# Patient Record
Sex: FEMALE | Race: WHITE | NOT HISPANIC OR LATINO | URBAN - METROPOLITAN AREA
[De-identification: names, ages, dates, MRNs, and addresses within clinical notes are randomized per-mention and may not be internally consistent; named-entity substitution may affect disease eponyms.]

---

## 2021-11-17 ENCOUNTER — OFFICE VISIT (OUTPATIENT)
Dept: URBAN - METROPOLITAN AREA CLINIC 94 | Facility: CLINIC | Age: 61
End: 2021-11-17
Payer: COMMERCIAL

## 2021-11-17 ENCOUNTER — WEB ENCOUNTER (OUTPATIENT)
Dept: URBAN - METROPOLITAN AREA CLINIC 94 | Facility: CLINIC | Age: 61
End: 2021-11-17

## 2021-11-17 VITALS
WEIGHT: 179 LBS | HEIGHT: 65 IN | BODY MASS INDEX: 29.82 KG/M2 | DIASTOLIC BLOOD PRESSURE: 80 MMHG | HEART RATE: 90 BPM | TEMPERATURE: 97.5 F | SYSTOLIC BLOOD PRESSURE: 153 MMHG

## 2021-11-17 DIAGNOSIS — R74.8 ELEVATED LIVER ENZYMES: ICD-10-CM

## 2021-11-17 DIAGNOSIS — K76.0 HEPATIC STEATOSIS: ICD-10-CM

## 2021-11-17 PROCEDURE — 99213 OFFICE O/P EST LOW 20 MIN: CPT | Performed by: PHYSICIAN ASSISTANT

## 2021-11-17 PROCEDURE — 99243 OFF/OP CNSLTJ NEW/EST LOW 30: CPT | Performed by: PHYSICIAN ASSISTANT

## 2021-11-17 RX ORDER — LEVOCETIRIZINE DIHYDROCHLORIDE 5 MG/1
TAKE 2 TABLETS BY ORAL ROUTE TABLET ORAL
Qty: 0 | Refills: 0 | Status: ACTIVE | COMMUNITY
Start: 1900-01-01

## 2021-11-17 RX ORDER — GABAPENTIN 800 MG/1
TAKE 1 TABLET (800 MG) BY ORAL ROUTE 3 TIMES PER DAY TABLET, FILM COATED ORAL
Qty: 0 | Refills: 0 | Status: ACTIVE | COMMUNITY
Start: 1900-01-01

## 2021-11-17 RX ORDER — DULOXETINE HYDROCHLORIDE 60 MG/1
1 CAPSULE CAPSULE, DELAYED RELEASE ORAL ONCE A DAY
Status: ACTIVE | COMMUNITY

## 2021-11-17 RX ORDER — ZOLPIDEM TARTRATE 10 MG/1
TAKE 1 TABLET (10 MG) BY ORAL ROUTE ONCE DAILY AT BEDTIME TABLET, FILM COATED ORAL 1
Qty: 0 | Refills: 0 | Status: ACTIVE | COMMUNITY
Start: 1900-01-01

## 2021-11-17 RX ORDER — ROPINIROLE 3 MG/1
TABLET, FILM COATED ORAL
Qty: 0 | Refills: 0 | Status: ACTIVE | COMMUNITY
Start: 1900-01-01

## 2021-11-17 RX ORDER — LISINOPRIL 10 MG/1
TAKE 1 TABLET (10 MG) BY ORAL ROUTE ONCE DAILY TABLET ORAL 1
Qty: 0 | Refills: 0 | Status: ON HOLD | COMMUNITY
Start: 1900-01-01

## 2021-11-17 RX ORDER — CLONAZEPAM 1 MG/1
1 TABLET TABLET ORAL ONCE A DAY
Status: ACTIVE | COMMUNITY

## 2021-11-17 RX ORDER — AMLODIPINE BESYLATE 10 MG/1
TAKE 1 TABLET (10 MG) BY ORAL ROUTE ONCE DAILY TABLET ORAL 1
Qty: 0 | Refills: 0 | Status: ACTIVE | COMMUNITY
Start: 1900-01-01

## 2021-11-17 NOTE — HPI-TODAY'S VISIT:
62 yo F evaluated today for hx of elevated liver enzymes. Pt is being referred to clinic by her PCP, DR Singleton. A copy of patient records will be faxed to DR Singleton for review.   Pt reports that she recently started seeing DR Singleton. Patient reports a hx of elevated liver enzymes for yrs and was dx with having fatty liver yrs ago. She denies a hx of ETOH use. She denies an autoimmune or genetic hx in family.  Patient does report taking opioids for chronic back pain and will be having surgery soon with DR Wong.   Pt denies abdominal swelling or chronic itching. She denies abdominal pain, changes in bowel habits, or melena/hematochezia.  Colonoscopy 1/2020 Dr Adame poor prep, Tubular adenoma ascending colon, 7 mm Transverse polyp ( no pathology  reported), otherwise normal colon Recommend surveillance colonoscopy 2 yrs  Colonoscopy 2018 DR Adame diverticulosis, 6 mm TV colon polyp and 24 mm descending colon polyp yuid-pedunculated. 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    EGD 2020 DR Adame - normal esophagus, gastric erythema, normal duodenum Bx reactive gastropathy, Negative H. Pylori.

## 2021-11-18 PROBLEM — 197321007: Status: ACTIVE | Noted: 2021-11-18

## 2021-11-18 PROBLEM — 428283002: Status: ACTIVE | Noted: 2021-11-18

## 2021-11-23 ENCOUNTER — TELEPHONE ENCOUNTER (OUTPATIENT)
Dept: URBAN - METROPOLITAN AREA CLINIC 94 | Facility: CLINIC | Age: 61
End: 2021-11-23

## 2021-11-24 LAB
A/G RATIO: 1.1
AAT, DNA ANALYSIS: (no result)
ACTIN (SMOOTH MUSCLE) ANTIBODY: 20
ADDITIONAL INFORMATION:: (no result)
ALBUMIN: 4.3
ALKALINE PHOSPHATASE: 197
ALPHA 2-MACROGLOBULINS, QN: 171
ALT (SGPT) P5P: 43
ALT (SGPT): 40
ANA DIRECT: POSITIVE
APOLIPOPROTEIN A-1: 137
AST (SGOT) P5P: 99
AST (SGOT): 89
BASO (ABSOLUTE): 0
BASOS: 1
BILIRUBIN, TOTAL: 0.3
BILIRUBIN, TOTAL: 0.3
BUN/CREATININE RATIO: 11
BUN: 7
CALCIUM: 9.6
CARBON DIOXIDE, TOTAL: 25
CERULOPLASMIN: 25
CHLORIDE: 99
CHOLESTEROL, TOTAL: 226
COMMENT:: (no result)
CREATININE: 0.61
EGFR IF AFRICN AM: 113
EGFR IF NONAFRICN AM: 98
EOS (ABSOLUTE): 0.2
EOS: 3
FERRITIN, SERUM: 95
FIBROSIS SCORE: 0.24
FIBROSIS SCORING:: (no result)
FIBROSIS STAGE: (no result)
GGT: 217
GLOBULIN, TOTAL: 3.9
GLUCOSE, SERUM: 132
GLUCOSE: 136
HAPTOGLOBIN: 189
HBSAG SCREEN: NEGATIVE
HEIGHT:: 65
HEMATOCRIT: 41.4
HEMATOLOGY COMMENTS:: (no result)
HEMOGLOBIN A1C: 7.7
HEMOGLOBIN: 13.4
HEP A AB, IGM: NEGATIVE
HEP B CORE AB, IGM: NEGATIVE
HEP C VIRUS AB: 0.1
IMMATURE CELLS: (no result)
IMMATURE GRANS (ABS): 0
IMMATURE GRANULOCYTES: 0
INTERPRETATIONS:: (no result)
IRON BIND.CAP.(TIBC): 323
IRON SATURATION: 14
IRON: 44
LIMITATIONS:: (no result)
LYMPHS (ABSOLUTE): 2.1
LYMPHS: 29
Lab: (no result)
MCH: 28.3
MCHC: 32.4
MCV: 87
MITOCHONDRIAL (M2) ANTIBODY: <20
MONOCYTES(ABSOLUTE): 0.5
MONOCYTES: 7
NASH GRADE: (no result)
NASH SCORE: 0.5
NASH SCORING: (no result)
NEUTROPHILS (ABSOLUTE): 4.4
NEUTROPHILS: 60
NRBC: (no result)
PLATELETS: 248
POTASSIUM: 3.8
PROTEIN, TOTAL: 8.2
RBC: 4.74
RDW: 15
SODIUM: 140
STEATOSIS GRADE: (no result)
STEATOSIS GRADING: (no result)
STEATOSIS SCORE: 0.93
TRIGLYCERIDES: 300
UIBC: 279
WBC: 7.2
WEIGHT:: 179

## 2021-12-03 ENCOUNTER — TELEPHONE ENCOUNTER (OUTPATIENT)
Dept: URBAN - METROPOLITAN AREA CLINIC 92 | Facility: CLINIC | Age: 61
End: 2021-12-03

## 2021-12-03 PROBLEM — 442685003: Status: ACTIVE | Noted: 2021-12-03

## 2021-12-14 ENCOUNTER — OFFICE VISIT (OUTPATIENT)
Dept: URBAN - METROPOLITAN AREA CLINIC 94 | Facility: CLINIC | Age: 61
End: 2021-12-14

## 2021-12-14 RX ORDER — LISINOPRIL 10 MG/1
TAKE 1 TABLET (10 MG) BY ORAL ROUTE ONCE DAILY TABLET ORAL 1
Qty: 0 | Refills: 0 | Status: ON HOLD | COMMUNITY
Start: 1900-01-01

## 2021-12-14 RX ORDER — LEVOCETIRIZINE DIHYDROCHLORIDE 5 MG/1
TAKE 2 TABLETS BY ORAL ROUTE TABLET ORAL
Qty: 0 | Refills: 0 | Status: ACTIVE | COMMUNITY
Start: 1900-01-01

## 2021-12-14 RX ORDER — ROPINIROLE 3 MG/1
TABLET, FILM COATED ORAL
Qty: 0 | Refills: 0 | Status: ACTIVE | COMMUNITY
Start: 1900-01-01

## 2021-12-14 RX ORDER — AMLODIPINE BESYLATE 10 MG/1
TAKE 1 TABLET (10 MG) BY ORAL ROUTE ONCE DAILY TABLET ORAL 1
Qty: 0 | Refills: 0 | Status: ACTIVE | COMMUNITY
Start: 1900-01-01

## 2021-12-14 RX ORDER — GABAPENTIN 800 MG/1
TAKE 1 TABLET (800 MG) BY ORAL ROUTE 3 TIMES PER DAY TABLET, FILM COATED ORAL
Qty: 0 | Refills: 0 | Status: ACTIVE | COMMUNITY
Start: 1900-01-01

## 2021-12-14 RX ORDER — CLONAZEPAM 1 MG/1
1 TABLET TABLET ORAL ONCE A DAY
Status: ACTIVE | COMMUNITY

## 2021-12-14 RX ORDER — DULOXETINE HYDROCHLORIDE 60 MG/1
1 CAPSULE CAPSULE, DELAYED RELEASE ORAL ONCE A DAY
Status: ACTIVE | COMMUNITY

## 2021-12-14 RX ORDER — ZOLPIDEM TARTRATE 10 MG/1
TAKE 1 TABLET (10 MG) BY ORAL ROUTE ONCE DAILY AT BEDTIME TABLET, FILM COATED ORAL 1
Qty: 0 | Refills: 0 | Status: ACTIVE | COMMUNITY
Start: 1900-01-01

## 2023-09-27 ENCOUNTER — DASHBOARD ENCOUNTERS (OUTPATIENT)
Age: 63
End: 2023-09-27

## 2023-10-03 ENCOUNTER — OFFICE VISIT (OUTPATIENT)
Dept: URBAN - METROPOLITAN AREA CLINIC 52 | Facility: CLINIC | Age: 63
End: 2023-10-03

## 2023-10-03 RX ORDER — LEVOCETIRIZINE DIHYDROCHLORIDE 5 MG/1
TAKE 2 TABLETS BY ORAL ROUTE TABLET ORAL
Qty: 0 | Refills: 0 | Status: ON HOLD | COMMUNITY
Start: 1900-01-01

## 2023-10-03 RX ORDER — ROPINIROLE 3 MG/1
TABLET, FILM COATED ORAL
Qty: 0 | Refills: 0 | Status: ON HOLD | COMMUNITY
Start: 1900-01-01

## 2023-10-03 RX ORDER — DULOXETINE HYDROCHLORIDE 60 MG/1
1 CAPSULE CAPSULE, DELAYED RELEASE ORAL ONCE A DAY
Status: ON HOLD | COMMUNITY

## 2023-10-03 RX ORDER — LISINOPRIL 10 MG/1
TAKE 1 TABLET (10 MG) BY ORAL ROUTE ONCE DAILY TABLET ORAL 1
Qty: 0 | Refills: 0 | COMMUNITY
Start: 1900-01-01

## 2023-10-03 RX ORDER — CLONAZEPAM 1 MG/1
1 TABLET TABLET ORAL ONCE A DAY
Status: ON HOLD | COMMUNITY

## 2023-10-03 RX ORDER — AMLODIPINE BESYLATE 10 MG/1
TAKE 1 TABLET (10 MG) BY ORAL ROUTE ONCE DAILY TABLET ORAL 1
Qty: 0 | Refills: 0 | Status: ON HOLD | COMMUNITY
Start: 1900-01-01

## 2023-10-03 RX ORDER — ZOLPIDEM TARTRATE 10 MG/1
TAKE 1 TABLET (10 MG) BY ORAL ROUTE ONCE DAILY AT BEDTIME TABLET, FILM COATED ORAL 1
Qty: 0 | Refills: 0 | Status: ON HOLD | COMMUNITY
Start: 1900-01-01

## 2023-10-03 RX ORDER — GABAPENTIN 800 MG/1
TAKE 1 TABLET (800 MG) BY ORAL ROUTE 3 TIMES PER DAY TABLET, FILM COATED ORAL
Qty: 0 | Refills: 0 | Status: ON HOLD | COMMUNITY
Start: 1900-01-01